# Patient Record
Sex: MALE | NOT HISPANIC OR LATINO | URBAN - METROPOLITAN AREA
[De-identification: names, ages, dates, MRNs, and addresses within clinical notes are randomized per-mention and may not be internally consistent; named-entity substitution may affect disease eponyms.]

---

## 2024-03-25 ENCOUNTER — APPOINTMENT (OUTPATIENT)
Dept: URBAN - METROPOLITAN AREA CLINIC 306 | Age: 44
Setting detail: DERMATOLOGY
End: 2024-03-25

## 2024-03-25 DIAGNOSIS — L21.8 OTHER SEBORRHEIC DERMATITIS: ICD-10-CM

## 2024-03-25 DIAGNOSIS — L20.89 OTHER ATOPIC DERMATITIS: ICD-10-CM

## 2024-03-25 DIAGNOSIS — L40.0 PSORIASIS VULGARIS: ICD-10-CM

## 2024-03-25 PROCEDURE — OTHER MIPS QUALITY: OTHER

## 2024-03-25 PROCEDURE — 99203 OFFICE O/P NEW LOW 30 MIN: CPT | Mod: 25

## 2024-03-25 PROCEDURE — OTHER PRESCRIPTION: OTHER

## 2024-03-25 PROCEDURE — OTHER INTRALESIONAL KENALOG: OTHER

## 2024-03-25 PROCEDURE — OTHER GENTLE SKIN CARE INSTRUCTIONS: OTHER

## 2024-03-25 PROCEDURE — 11900 INJECT SKIN LESIONS </W 7: CPT

## 2024-03-25 PROCEDURE — OTHER PRESCRIPTION MEDICATION MANAGEMENT: OTHER

## 2024-03-25 PROCEDURE — OTHER COUNSELING: OTHER

## 2024-03-25 RX ORDER — KETOCONAZOLE 20 MG/ML
SHAMPOO, SUSPENSION TOPICAL TIW
Qty: 120 | Refills: 11 | Status: ERX | COMMUNITY
Start: 2024-03-25

## 2024-03-25 RX ORDER — TRIAMCINOLONE ACETONIDE 1 MG/G
CREAM TOPICAL BID
Qty: 454 | Refills: 3 | Status: ERX | COMMUNITY
Start: 2024-03-25

## 2024-03-25 ASSESSMENT — LOCATION DETAILED DESCRIPTION DERM
LOCATION DETAILED: RIGHT SUPERIOR PARIETAL SCALP
LOCATION DETAILED: LEFT CENTRAL PARIETAL SCALP
LOCATION DETAILED: SUPERIOR THORACIC SPINE

## 2024-03-25 ASSESSMENT — LOCATION SIMPLE DESCRIPTION DERM
LOCATION SIMPLE: UPPER BACK
LOCATION SIMPLE: SCALP

## 2024-03-25 ASSESSMENT — LOCATION ZONE DERM
LOCATION ZONE: SCALP
LOCATION ZONE: TRUNK

## 2024-03-25 NOTE — PROCEDURE: INTRALESIONAL KENALOG
Show Inventory Tab: Hide
Require Ndc Code?: No
How Many Mls Were Removed From The 10 Mg/Ml (5ml) Vial When Preparing The Injectable Solution?: 0
Concentration Of Kenalog Solution Injected (Mg/Ml): 2.5
Ndc# For Kenalog Only: [id number]
Kenalog Preparation: Kenalog in bacteriostatic water
Validate Note Data When Using Inventory: Yes
Detail Level: Detailed
Total Volume (Ccs): 1
Medical Necessity Clause: This procedure was medically necessary because the lesions that were treated were:
Kenalog Type Of Vial: Multiple Dose
Consent: The risks of atrophy were reviewed with the patient.
Administered By (Optional): Gogo MOE

## 2024-03-25 NOTE — PROCEDURE: PRESCRIPTION MEDICATION MANAGEMENT
Detail Level: Zone
Render In Strict Bullet Format?: No
Initiate Treatment: Triamcinolone
Initiate Treatment: Ketoconazole shampoo

## 2024-11-18 ENCOUNTER — APPOINTMENT (OUTPATIENT)
Dept: URBAN - METROPOLITAN AREA CLINIC 306 | Age: 44
Setting detail: DERMATOLOGY
End: 2024-11-18

## 2024-11-18 DIAGNOSIS — B36.0 PITYRIASIS VERSICOLOR: ICD-10-CM

## 2024-11-18 PROCEDURE — OTHER PRESCRIPTION: OTHER

## 2024-11-18 PROCEDURE — 99213 OFFICE O/P EST LOW 20 MIN: CPT

## 2024-11-18 PROCEDURE — OTHER COUNSELING: OTHER

## 2024-11-18 RX ORDER — FLUCONAZOLE 200 MG/1
TABLET ORAL
Qty: 15 | Refills: 2 | Status: ERX | COMMUNITY
Start: 2024-11-18